# Patient Record
Sex: MALE | HISPANIC OR LATINO | Employment: UNEMPLOYED | URBAN - METROPOLITAN AREA
[De-identification: names, ages, dates, MRNs, and addresses within clinical notes are randomized per-mention and may not be internally consistent; named-entity substitution may affect disease eponyms.]

---

## 2022-05-16 ENCOUNTER — HOSPITAL ENCOUNTER (EMERGENCY)
Age: 22
Discharge: HOME | End: 2022-05-16

## 2022-05-16 VITALS
OXYGEN SATURATION: 99 % | DIASTOLIC BLOOD PRESSURE: 68 MMHG | SYSTOLIC BLOOD PRESSURE: 151 MMHG | HEART RATE: 70 BPM | TEMPERATURE: 98.2 F | RESPIRATION RATE: 18 BRPM

## 2022-05-16 DIAGNOSIS — L05.01 PILONIDAL ABSCESS: Primary | ICD-10-CM

## 2022-05-16 PROCEDURE — 10080 I&D PILONIDAL CYST SIMPLE: CPT | Performed by: PHYSICIAN ASSISTANT

## 2022-05-16 PROCEDURE — 10080 I&D PILONIDAL CYST SIMPLE: CPT

## 2022-05-16 PROCEDURE — 99282 EMERGENCY DEPT VISIT SF MDM: CPT

## 2022-05-16 PROCEDURE — 99284 EMERGENCY DEPT VISIT MOD MDM: CPT | Performed by: PHYSICIAN ASSISTANT

## 2022-05-16 RX ORDER — SULFAMETHOXAZOLE AND TRIMETHOPRIM 800; 160 MG/1; MG/1
1 TABLET ORAL 2 TIMES DAILY
Qty: 20 TABLET | Refills: 0 | Status: SHIPPED | OUTPATIENT
Start: 2022-05-16

## 2022-05-16 RX ORDER — IBUPROFEN 600 MG/1
600 TABLET ORAL EVERY 8 HOURS PRN
Qty: 12 TABLET | Refills: 0 | Status: SHIPPED | OUTPATIENT
Start: 2022-05-16

## 2022-05-16 ASSESSMENT — ENCOUNTER SYMPTOMS
FEVER: 0
COUGH: 0
FATIGUE: 0
VOMITING: 0
ABDOMINAL PAIN: 0
DIZZINESS: 0
NAUSEA: 0
ANOREXIA: 0
DIARRHEA: 0
SHORTNESS OF BREATH: 0
HEADACHES: 0

## 2024-03-26 ENCOUNTER — OFFICE VISIT (OUTPATIENT)
Age: 24
End: 2024-03-26
Payer: COMMERCIAL

## 2024-03-26 ENCOUNTER — APPOINTMENT (OUTPATIENT)
Age: 24
End: 2024-03-26
Payer: COMMERCIAL

## 2024-03-26 ENCOUNTER — APPOINTMENT (EMERGENCY)
Dept: CT IMAGING | Facility: HOSPITAL | Age: 24
End: 2024-03-26
Payer: COMMERCIAL

## 2024-03-26 ENCOUNTER — HOSPITAL ENCOUNTER (EMERGENCY)
Facility: HOSPITAL | Age: 24
Discharge: HOME | End: 2024-03-26
Attending: EMERGENCY MEDICINE
Payer: COMMERCIAL

## 2024-03-26 VITALS
DIASTOLIC BLOOD PRESSURE: 85 MMHG | SYSTOLIC BLOOD PRESSURE: 141 MMHG | TEMPERATURE: 97.5 F | HEART RATE: 86 BPM | RESPIRATION RATE: 16 BRPM | OXYGEN SATURATION: 98 % | WEIGHT: 176 LBS

## 2024-03-26 VITALS
DIASTOLIC BLOOD PRESSURE: 70 MMHG | RESPIRATION RATE: 20 BRPM | OXYGEN SATURATION: 99 % | SYSTOLIC BLOOD PRESSURE: 145 MMHG | HEART RATE: 85 BPM | TEMPERATURE: 97.8 F

## 2024-03-26 DIAGNOSIS — R10.9 ABDOMINAL PAIN, UNSPECIFIED ABDOMINAL LOCATION: ICD-10-CM

## 2024-03-26 DIAGNOSIS — R10.9 ABDOMINAL PAIN, UNSPECIFIED ABDOMINAL LOCATION: Primary | ICD-10-CM

## 2024-03-26 DIAGNOSIS — R10.9 NONSPECIFIC ABDOMINAL PAIN: Primary | ICD-10-CM

## 2024-03-26 LAB
ALBUMIN SERPL BCP-MCNC: 4.9 G/DL (ref 3.5–5)
ALP SERPL-CCNC: 72 U/L (ref 34–104)
ALT SERPL W P-5'-P-CCNC: 18 U/L (ref 7–52)
ANION GAP SERPL CALCULATED.3IONS-SCNC: 10 MMOL/L (ref 4–13)
AST SERPL W P-5'-P-CCNC: 17 U/L (ref 13–39)
BASOPHILS # BLD AUTO: 0.06 THOUSANDS/ÂΜL (ref 0–0.1)
BASOPHILS NFR BLD AUTO: 1 % (ref 0–1)
BILIRUB SERPL-MCNC: 0.74 MG/DL (ref 0.2–1)
BUN SERPL-MCNC: 19 MG/DL (ref 5–25)
CALCIUM SERPL-MCNC: 9.7 MG/DL (ref 8.4–10.2)
CHLORIDE SERPL-SCNC: 105 MMOL/L (ref 96–108)
CO2 SERPL-SCNC: 22 MMOL/L (ref 21–32)
CREAT SERPL-MCNC: 1.02 MG/DL (ref 0.6–1.3)
EOSINOPHIL # BLD AUTO: 0.13 THOUSAND/ÂΜL (ref 0–0.61)
EOSINOPHIL NFR BLD AUTO: 2 % (ref 0–6)
ERYTHROCYTE [DISTWIDTH] IN BLOOD BY AUTOMATED COUNT: 12.5 % (ref 11.6–15.1)
GFR SERPL CREATININE-BSD FRML MDRD: 103 ML/MIN/1.73SQ M
GLUCOSE SERPL-MCNC: 80 MG/DL (ref 65–140)
HCT VFR BLD AUTO: 47.6 % (ref 36.5–49.3)
HGB BLD-MCNC: 16.2 G/DL (ref 12–17)
IMM GRANULOCYTES # BLD AUTO: 0.04 THOUSAND/UL (ref 0–0.2)
IMM GRANULOCYTES NFR BLD AUTO: 1 % (ref 0–2)
LIPASE SERPL-CCNC: 14 U/L (ref 11–82)
LYMPHOCYTES # BLD AUTO: 2.06 THOUSANDS/ÂΜL (ref 0.6–4.47)
LYMPHOCYTES NFR BLD AUTO: 25 % (ref 14–44)
MCH RBC QN AUTO: 28.7 PG (ref 26.8–34.3)
MCHC RBC AUTO-ENTMCNC: 34 G/DL (ref 31.4–37.4)
MCV RBC AUTO: 84 FL (ref 82–98)
MONOCYTES # BLD AUTO: 0.75 THOUSAND/ÂΜL (ref 0.17–1.22)
MONOCYTES NFR BLD AUTO: 9 % (ref 4–12)
NEUTROPHILS # BLD AUTO: 5.11 THOUSANDS/ÂΜL (ref 1.85–7.62)
NEUTS SEG NFR BLD AUTO: 62 % (ref 43–75)
NRBC BLD AUTO-RTO: 0 /100 WBCS
PLATELET # BLD AUTO: 316 THOUSANDS/UL (ref 149–390)
PMV BLD AUTO: 9.2 FL (ref 8.9–12.7)
POTASSIUM SERPL-SCNC: 4.1 MMOL/L (ref 3.5–5.3)
PROT SERPL-MCNC: 8 G/DL (ref 6.4–8.4)
RBC # BLD AUTO: 5.64 MILLION/UL (ref 3.88–5.62)
SODIUM SERPL-SCNC: 137 MMOL/L (ref 135–147)
WBC # BLD AUTO: 8.15 THOUSAND/UL (ref 4.31–10.16)

## 2024-03-26 PROCEDURE — 99284 EMERGENCY DEPT VISIT MOD MDM: CPT | Performed by: PHYSICIAN ASSISTANT

## 2024-03-26 PROCEDURE — 74022 RADEX COMPL AQT ABD SERIES: CPT

## 2024-03-26 PROCEDURE — 36415 COLL VENOUS BLD VENIPUNCTURE: CPT | Performed by: PHYSICIAN ASSISTANT

## 2024-03-26 PROCEDURE — 99284 EMERGENCY DEPT VISIT MOD MDM: CPT

## 2024-03-26 PROCEDURE — 83690 ASSAY OF LIPASE: CPT | Performed by: PHYSICIAN ASSISTANT

## 2024-03-26 PROCEDURE — 74177 CT ABD & PELVIS W/CONTRAST: CPT

## 2024-03-26 PROCEDURE — 99213 OFFICE O/P EST LOW 20 MIN: CPT | Performed by: EMERGENCY MEDICINE

## 2024-03-26 PROCEDURE — 80053 COMPREHEN METABOLIC PANEL: CPT | Performed by: PHYSICIAN ASSISTANT

## 2024-03-26 PROCEDURE — 85025 COMPLETE CBC W/AUTO DIFF WBC: CPT | Performed by: PHYSICIAN ASSISTANT

## 2024-03-26 RX ORDER — FAMOTIDINE 20 MG/1
20 TABLET, FILM COATED ORAL 2 TIMES DAILY
Qty: 30 TABLET | Refills: 0 | Status: SHIPPED | OUTPATIENT
Start: 2024-03-26

## 2024-03-26 RX ORDER — ONDANSETRON 4 MG/1
4 TABLET, ORALLY DISINTEGRATING ORAL EVERY 8 HOURS PRN
Qty: 20 TABLET | Refills: 0 | Status: SHIPPED | OUTPATIENT
Start: 2024-03-26

## 2024-03-26 RX ADMIN — IOHEXOL 100 ML: 350 INJECTION, SOLUTION INTRAVENOUS at 16:55

## 2024-03-26 NOTE — DISCHARGE INSTRUCTIONS
Continue supportive measures including reflux precautions as discussed to include avoiding heavily acidic or spicy foods, late-night snacking.   Recommend establishing outpatient care with gastroenterology as above.  Return to the ED with any new or worsening symptoms as discussed.

## 2024-03-26 NOTE — ED PROVIDER NOTES
"History  Chief Complaint   Patient presents with    Abdominal Pain     Patient c/o mid abdominal pain that started about 3 days ago. Patient states\"it hurts after he eats\" Patient denies nausea or vomiting.     HPI    None       History reviewed. No pertinent past medical history.    History reviewed. No pertinent surgical history.    History reviewed. No pertinent family history.  I have reviewed and agree with the history as documented.    E-Cigarette/Vaping    E-Cigarette Use Never User      E-Cigarette/Vaping Substances     Social History     Tobacco Use    Smoking status: Never    Smokeless tobacco: Never   Vaping Use    Vaping status: Never Used   Substance Use Topics    Alcohol use: Never    Drug use: Never       Review of Systems    Physical Exam  Physical Exam    Vital Signs  ED Triage Vitals   Temperature Pulse Respirations Blood Pressure SpO2   03/26/24 1449 03/26/24 1449 03/26/24 1449 03/26/24 1449 03/26/24 1449   98 °F (36.7 °C) 82 18 154/90 98 %      Temp Source Heart Rate Source Patient Position - Orthostatic VS BP Location FiO2 (%)   03/26/24 1747 03/26/24 1747 -- 03/26/24 1747 --   Oral Monitor  Right arm       Pain Score       03/26/24 1449       No Pain           Vitals:    03/26/24 1449 03/26/24 1747   BP: 154/90 155/78   Pulse: 82 100         Visual Acuity  Visual Acuity      Flowsheet Row Most Recent Value   L Pupil Size (mm) 3   R Pupil Size (mm) 3            ED Medications  Medications   iohexol (OMNIPAQUE) 350 MG/ML injection (MULTI-DOSE) 100 mL (100 mL Intravenous Given 3/26/24 1655)       Diagnostic Studies  Results Reviewed       Procedure Component Value Units Date/Time    Comprehensive metabolic panel [[id number]] Collected: 03/26/24 1622    Lab Status: Final result Specimen: Blood from Arm, Left Updated: 03/26/24 1641     Sodium 137 mmol/L      Potassium 4.1 mmol/L      Chloride 105 mmol/L      CO2 22 mmol/L      ANION GAP 10 mmol/L      BUN 19 mg/dL      Creatinine 1.02 mg/dL      " Glucose 80 mg/dL      Calcium 9.7 mg/dL      AST 17 U/L      ALT 18 U/L      Alkaline Phosphatase 72 U/L      Total Protein 8.0 g/dL      Albumin 4.9 g/dL      Total Bilirubin 0.74 mg/dL      eGFR 103 ml/min/1.73sq m     Narrative:      National Kid[employer] guidelines for Chronic Kidney Disease (CKD):     Stage 1 with normal or high GFR (GFR > 90 mL/min/1.73 square meters)    Stage 2 Mild CKD (GFR = 60-89 mL/min/1.73 square meters)    Stage 3A Moderate CKD (GFR = 45-59 mL/min/1.73 square meters)    Stage 3B Moderate CKD (GFR = 30-44 mL/min/1.73 square meters)    Stage 4 Severe CKD (GFR = 15-29 mL/min/1.73 square meters)    Stage 5 End Stage CKD (GFR <15 mL/min/1.73 square meters)  Note: GFR calculation is accurate only with a steady state creatinine    Lipase [[id number]]  (Normal) Collected: 03/26/24 1622    Lab Status: Final result Specimen: Blood from Arm, Left Updated: 03/26/24 1641     Lipase 14 u/L     CBC and differential [[id number]]  (Abnormal) Collected: 03/26/24 1622    Lab Status: Final result Specimen: Blood from Arm, Left Updated: 03/26/24 1627     WBC 8.15 Thousand/uL      RBC 5.64 Million/uL      Hemoglobin 16.2 g/dL      Hematocrit 47.6 %      MCV 84 fL      MCH 28.7 pg      MCHC 34.0 g/dL      RDW 12.5 %      MPV 9.2 fL      Platelets 316 Thousands/uL      nRBC 0 /100 WBCs      Neutrophils Relative 62 %      Immature Grans % 1 %      Lymphocytes Relative 25 %      Monocytes Relative 9 %      Eosinophils Relative 2 %      Basophils Relative 1 %      Neutrophils Absolute 5.11 Thousands/µL      Absolute Immature Grans 0.04 Thousand/uL      Absolute Lymphocytes 2.06 Thousands/µL      Absolute Monocytes 0.75 Thousand/µL      Eosinophils Absolute 0.13 Thousand/µL      Basophils Absolute 0.06 Thousands/µL                    CT abdomen pelvis with contrast   Final Result by Jeff Esqueda MD (03/26 1812)      Apparent mild wall thickening of the rectum. While this could be due to relative  collapse, a mild proctitis cannot be excluded.      Possible faint punctate gallstone within the gallbladder. No pericholecystic inflammation. If clinically warranted, this can be further evaluated with a right upper quadrant abdominal ultrasound.      Otherwise, no acute CT findings in the abdomen or pelvis.               Workstation performed: [id number]                    Procedures  Procedures         ED Course                               SBIRT 20yo+      Flowsheet Row Most Recent Value   Initial Alcohol Screen: US AUDIT-C     1. How often do you have a drink containing alcohol? 0 Filed at: 03/26/2024 1449   2. How many drinks containing alcohol do you have on a typical day you are drinking?  0 Filed at: 03/26/2024 1449   3a. Male UNDER 65: How often do you have five or more drinks on one occasion? 0 Filed at: 03/26/2024 1449   3b. FEMALE Any Age, or MALE 65+: How often do you have 4 or more drinks on one occassion? 0 Filed at: 03/26/2024 1449   Audit-C Score 0 Filed at: 03/26/2024 1449   SYLVIA: How many times in the past year have you...    Used an illegal drug or used a prescription medication for non-medical reasons? Never Filed at: 03/26/2024 1449                      Medical Decision Making  Amount and/or Complexity of Data Reviewed  Labs: ordered.  Radiology: ordered.    Risk  Prescription drug management.             Disposition  Final diagnoses:   None     ED Disposition       None          Follow-up Information    None         Patient's Medications    No medications on file       No discharge procedures on file.    PDMP Review       None            ED Provider  Electronically Signed by           Thousand/uL      RBC 5.64 Million/uL      Hemoglobin 16.2 g/dL      Hematocrit 47.6 %      MCV 84 fL      MCH 28.7 pg      MCHC 34.0 g/dL      RDW 12.5 %      MPV 9.2 fL      Platelets 316 Thousands/uL      nRBC 0 /100 WBCs      Neutrophils Relative 62 %      Immature Grans % 1 %      Lymphocytes Relative 25 %      Monocytes Relative 9 %      Eosinophils Relative 2 %      Basophils Relative 1 %      Neutrophils Absolute 5.11 Thousands/µL      Absolute Immature Grans 0.04 Thousand/uL      Absolute Lymphocytes 2.06 Thousands/µL      Absolute Monocytes 0.75 Thousand/µL      Eosinophils Absolute 0.13 Thousand/µL      Basophils Absolute 0.06 Thousands/µL                    CT abdomen pelvis with contrast   Final Result by Jeff Esqueda MD (03/26 1812)      Apparent mild wall thickening of the rectum. While this could be due to relative collapse, a mild proctitis cannot be excluded.      Possible faint punctate gallstone within the gallbladder. No pericholecystic inflammation. If clinically warranted, this can be further evaluated with a right upper quadrant abdominal ultrasound.      Otherwise, no acute CT findings in the abdomen or pelvis.               Workstation performed: [id number]                    Procedures  Procedures         ED Course                               SBIRT 22yo+      Flowsheet Row Most Recent Value   Initial Alcohol Screen: US AUDIT-C     1. How often do you have a drink containing alcohol? 0 Filed at: 03/26/2024 1449   2. How many drinks containing alcohol do you have on a typical day you are drinking?  0 Filed at: 03/26/2024 1449   3a. Male UNDER 65: How often do you have five or more drinks on one occasion? 0 Filed at: 03/26/2024 1449   3b. FEMALE Any Age, or MALE 65+: How often do you have 4 or more drinks on one occassion? 0 Filed at: 03/26/2024 1449   Audit-C Score 0 Filed at: 03/26/2024 1449   SYLVIA: How many times in the past year have you...    Used an illegal drug or used a prescription  medication for non-medical reasons? Never Filed at: 03/26/2024 1449                      Medical Decision Making  MDM: This is a 23-year-old male presenting for evaluation with complaint of abdominal pain. Reports waxing and waning episodes of midline abdominal discomfort for about 3 days.  No appreciable nausea, vomiting, diarrhea.  No fevers, chills, sweats.  Patient's vital signs are stable on presentation, he is afebrile and nontoxic in appearance.  Examination as above.  The patient has a benign abdominal exam.  He was evaluated at urgent care earlier today for symptoms and had an inconclusive KUB and was recommended to proceed to the ED for further evaluation.  Discussed plan to obtain labs and imaging with mother at bedside which she is comfortable and agreeable with.  Labs independently reviewed which are without any significant findings.  CT abdomen/pelvis reports apparent mild wall thickening of the rectum and a mild proctitis cannot be excluded.  There is also mention of a possible faint punctate gallstone within the gallbladder with no pericholecystic inflammation.  We discussed the possibility of biliary colic however in the absence of any present abdominal pain or reproducible tenderness on exam mother is comfortable with plan to defer a right upper quadrant ultrasound at this time with plan for discharge with outpatient gastroenterology follow-up.  Will discharge with prescriptions for Zofran and Pepcid to assist with symptom management, reflux precautions reviewed.  Outpatient gastroenterology follow-up included in discharge paperwork.  Parameters for ED return discussed at length.  Patient and mother comfortable and agreeable with plan as above and the patient was discharged in stable condition.    Amount and/or Complexity of Data Reviewed  Independent Historian: parent  Labs: ordered.  Radiology: ordered.    Risk  Prescription drug management.             Disposition  Final diagnoses:   Nonspecific  abdominal pain     Time reflects when diagnosis was documented in both MDM as applicable and the Disposition within this note       Time User Action Codes Description Comment    3/26/2024  7:12 PM Charley Reyes Add [R10.9] Nonspecific abdominal pain           ED Disposition       ED Disposition   Discharge    Condition   Stable    Date/Time   Tue Mar 26, 2024 1912    Comment   Baldemar Goodwin discharge to home/self care.                   Follow-up Information       Follow up With Specialties Details Why Contact Info Additional Information    [facility]   [address]  [facility] [address] [phone] [facility], [address] [facility] [address], [place], [place], [address] [phone]    [facility] Emergency Medicine  If symptoms worsen [address] [facility] [id number] [phone] [facility] Emergency Department, [address] [place], [place], [address]            Discharge Medication List as of 3/26/2024  7:22 PM        START taking these medications    Details   famotidine (PEPCID) 20 mg tablet Take 1 tablet (20 mg total) by mouth 2 (two) times a day, Starting Tue 3/26/2024, Normal      ondansetron (Zofran ODT) 4 mg disintegrating tablet Take 1 tablet (4 mg total) by mouth every 8 (eight) hours as needed for nausea or vomiting, Starting Tue 3/26/2024, Normal             No discharge procedures on file.    PDMP Review       None            ED Provider  Electronically Signed by             Charley Reyes PA-C  04/02/24 1342

## 2024-03-26 NOTE — PROGRESS NOTES
"  [facility]        NAME: Baldemar Goodwin is a 23 y.o. male  : 2000    MRN: [id number]  DATE: 2024  TIME: 9:10 PM    Assessment and Plan   Abdominal pain, unspecified abdominal location [R10.9]  1. Abdominal pain, unspecified abdominal location  XR abdomen obstruction series    Transfer to other facility    r/o volvuls        Obst series:  Called to speak with radiologist - Spoke with Dr. Jackson, states dilated loop of large bowel - recd's CT of abdomen    Patient Instructions     Patient Instructions    Go directly to the ER - I think you need a CT scan of your abdomen  Encourage liquids   F/u with PCP in 2-3 days      Follow up with PCP in 3-5 days.  Proceed to  ER if symptoms worsen.    Chief Complaint     Chief Complaint   Patient presents with    Abdominal Pain     Stomach pains started about 2 days ago. Patient stated he have pains every time he eats something.          History of Present Illness       22 yo male with cc abdominal pain since Saturday morning.  Pt. States that every time he tries to eat something, he experiences pain.  Pt. Denies fever/chills.  Pt. Has only eaten a \"banana\" since that time.  Pt. Denies any burning on urination.        Review of Systems   Review of Systems   Constitutional:  Negative for chills and fever.   HENT:  Negative for congestion and rhinorrhea.    Eyes:  Negative for discharge and visual disturbance.   Respiratory:  Negative for shortness of breath and wheezing.    Cardiovascular:  Negative for chest pain and palpitations.   Gastrointestinal:  Positive for abdominal distention, abdominal pain and nausea. Negative for vomiting.   Endocrine: Negative for polydipsia and polyuria.   Genitourinary:  Negative for dysuria and hematuria.   Musculoskeletal:  Negative for arthralgias, gait problem and neck stiffness.   Skin:  Negative for rash and wound.   Neurological:  Negative for dizziness and headaches.   Psychiatric/Behavioral:  Negative for " confusion and suicidal ideas.          Current Medications       Current Outpatient Medications:     famotidine (PEPCID) 20 mg tablet, Take 1 tablet (20 mg total) by mouth 2 (two) times a day, Disp: 30 tablet, Rfl: 0    ondansetron (Zofran ODT) 4 mg disintegrating tablet, Take 1 tablet (4 mg total) by mouth every 8 (eight) hours as needed for nausea or vomiting, Disp: 20 tablet, Rfl: 0  No current facility-administered medications for this visit.    Current Allergies     Allergies as of 03/26/2024    (No Known Allergies)            The following portions of the patient's history were reviewed and updated as appropriate: allergies, current medications, past family history, past medical history, past social history, past surgical history and problem list.     No past medical history on file.    No past surgical history on file.    No family history on file.      Medications have been verified.        Objective   /85   Pulse 86   Temp 97.5 °F (36.4 °C)   Resp 16   Wt 79.8 kg (176 lb)   SpO2 98%        Physical Exam     Physical Exam  Vitals and nursing note reviewed.   Constitutional:       General: He is not in acute distress.     Appearance: Normal appearance. He is not ill-appearing, toxic-appearing or diaphoretic.      Comments: 24 yo male sitting on the exam table c/o abdominal pain.  Pt. Appears to be developmentally delayed, but very pleasant and cooperative.  Mom aids in answering some of the questions.  Pt. Appears dehydrated.   HENT:      Head: Normocephalic and atraumatic.      Right Ear: Tympanic membrane normal.      Left Ear: Tympanic membrane normal.      Nose: Nose normal.      Mouth/Throat:      Pharynx: Oropharynx is clear. No oropharyngeal exudate or posterior oropharyngeal erythema.      Comments: + dry chapped lips  Eyes:      Extraocular Movements: Extraocular movements intact.      Pupils: Pupils are equal, round, and reactive to light.   Neck:      Vascular: No carotid bruit.    Cardiovascular:      Rate and Rhythm: Normal rate and regular rhythm.      Pulses: Normal pulses.   Pulmonary:      Effort: Pulmonary effort is normal.   Abdominal:      General: Abdomen is flat. Bowel sounds are decreased. There is no distension.      Palpations: Abdomen is soft. There is no mass.      Tenderness: There is no abdominal tenderness. There is no right CVA tenderness, left CVA tenderness, guarding or rebound.      Hernia: No hernia is present.      Comments: Pt. C/o pain from the mid-epigastric to the suprapubic region, however, is non-tender on palpation.  Hypoactive bs - only heard in the right mid-quadrant   Musculoskeletal:         General: No swelling, tenderness, deformity or signs of injury. Normal range of motion.      Cervical back: Normal range of motion and neck supple. No rigidity. No muscular tenderness.      Right lower leg: No edema.      Left lower leg: No edema.   Lymphadenopathy:      Cervical: No cervical adenopathy.   Skin:     General: Skin is warm and dry.      Coloration: Skin is not jaundiced or pale.      Findings: No bruising, erythema, lesion or rash.   Neurological:      General: No focal deficit present.      Mental Status: He is alert and oriented to person, place, and time.      Cranial Nerves: No cranial nerve deficit.      Motor: No weakness.   Psychiatric:         Mood and Affect: Mood normal.

## 2024-03-26 NOTE — PATIENT INSTRUCTIONS
Go directly to the ER - I think you need a CT scan of your abdomen  Encourage liquids   F/u with PCP in 2-3 days